# Patient Record
Sex: MALE | Race: WHITE | NOT HISPANIC OR LATINO | Employment: FULL TIME | URBAN - METROPOLITAN AREA
[De-identification: names, ages, dates, MRNs, and addresses within clinical notes are randomized per-mention and may not be internally consistent; named-entity substitution may affect disease eponyms.]

---

## 2023-09-08 PROBLEM — H81.10 BENIGN PAROXYSMAL POSITIONAL VERTIGO: Status: ACTIVE | Noted: 2023-09-08

## 2023-09-08 PROBLEM — E55.9 VITAMIN D DEFICIENCY: Status: ACTIVE | Noted: 2023-09-08

## 2023-09-08 PROBLEM — R14.0 BLOATING SYMPTOM: Status: ACTIVE | Noted: 2023-09-08

## 2023-09-08 PROBLEM — D72.819 LEUKOPENIA: Status: ACTIVE | Noted: 2023-09-08

## 2023-09-08 PROBLEM — R42 DIZZINESS: Status: ACTIVE | Noted: 2023-09-08

## 2023-11-30 ENCOUNTER — OFFICE VISIT (OUTPATIENT)
Dept: SURGERY | Facility: CLINIC | Age: 33
End: 2023-11-30
Payer: COMMERCIAL

## 2023-11-30 VITALS
WEIGHT: 183 LBS | BODY MASS INDEX: 24.79 KG/M2 | RESPIRATION RATE: 17 BRPM | HEIGHT: 72 IN | TEMPERATURE: 97.3 F | HEART RATE: 67 BPM | DIASTOLIC BLOOD PRESSURE: 90 MMHG | SYSTOLIC BLOOD PRESSURE: 147 MMHG

## 2023-11-30 DIAGNOSIS — K64.1 PROLAPSED INTERNAL HEMORRHOIDS, GRADE 2: Primary | ICD-10-CM

## 2023-11-30 PROCEDURE — 99213 OFFICE O/P EST LOW 20 MIN: CPT | Performed by: COLON & RECTAL SURGERY

## 2023-11-30 PROCEDURE — 1036F TOBACCO NON-USER: CPT | Performed by: COLON & RECTAL SURGERY

## 2023-11-30 ASSESSMENT — ENCOUNTER SYMPTOMS
PALPITATIONS: 0
WEAKNESS: 0
WHEEZING: 0
BRUISES/BLEEDS EASILY: 0
ALLERGIC/IMMUNOLOGIC NEGATIVE: 1
COUGH: 0
DIAPHORESIS: 0
CHILLS: 0
COLOR CHANGE: 0
JOINT SWELLING: 0
DIFFICULTY URINATING: 0
STRIDOR: 0
LOSS OF SENSATION IN FEET: 0
ABDOMINAL PAIN: 0
SEIZURES: 0
HEMATURIA: 0
ARTHRALGIAS: 0
DIZZINESS: 0
ABDOMINAL DISTENTION: 0
OCCASIONAL FEELINGS OF UNSTEADINESS: 0
DEPRESSION: 0
RECTAL PAIN: 0
FLANK PAIN: 0
SORE THROAT: 0
SHORTNESS OF BREATH: 0
SINUS PRESSURE: 0
BACK PAIN: 0
ANAL BLEEDING: 0
SPEECH DIFFICULTY: 0
TROUBLE SWALLOWING: 0
NERVOUS/ANXIOUS: 0

## 2023-11-30 ASSESSMENT — PATIENT HEALTH QUESTIONNAIRE - PHQ9
SUM OF ALL RESPONSES TO PHQ9 QUESTIONS 1 & 2: 0
1. LITTLE INTEREST OR PLEASURE IN DOING THINGS: NOT AT ALL
2. FEELING DOWN, DEPRESSED OR HOPELESS: NOT AT ALL

## 2023-11-30 ASSESSMENT — LIFESTYLE VARIABLES
AUDIT-C TOTAL SCORE: 1
HOW OFTEN DO YOU HAVE SIX OR MORE DRINKS ON ONE OCCASION: NEVER
HOW MANY STANDARD DRINKS CONTAINING ALCOHOL DO YOU HAVE ON A TYPICAL DAY: 1 OR 2
SKIP TO QUESTIONS 9-10: 1
HOW OFTEN DO YOU HAVE A DRINK CONTAINING ALCOHOL: MONTHLY OR LESS

## 2023-11-30 ASSESSMENT — PAIN SCALES - GENERAL: PAINLEVEL: 0-NO PAIN

## 2023-11-30 NOTE — ASSESSMENT & PLAN NOTE
After lengthy discussion with him regarding treatment for this, I explained rubber band ligation of his internal hemorrhoids with the procedure, risk, complications and no anesthetic used.  I told him my goal was to avoid a surgical hemorrhoidectomy and he then agreed to proceed with rubber band ligation today.  He was given a postprocedure instruction sheet afterwards and he will follow-up with my partner in 4 to 6 weeks.  I answered all of his questions.

## 2023-11-30 NOTE — PROGRESS NOTES
Chief Complaint   Patient presents with    Follow-up     Wanting to have a rubberband ligation done - last seen 08/29/23 for perianal dermatitis and pruritus ani. No banding done at that time.        HPI: I last saw this 33-year-old gentleman in August 2023 at which time there was a question as to whether or not he was prolapsing his internal hemorrhoids giving his symptoms of intermittent perianal itching.  At that time he was going to think about scheduling a flexible sigmoidoscopy with banding of his internal hemorrhoid and he is now back for follow-up.  The procedure was never done.  Today he still has problems with hygiene, itching and soiling especially after he moves his bowels.  He would like his hemorrhoids banded today here in the office.    Past Medical History:   Diagnosis Date    Leukopenia     Vertigo        History reviewed. No pertinent surgical history.    No current outpatient medications on file.     No current facility-administered medications for this visit.       Patient has no known allergies.    Social History     Tobacco Use    Smoking status: Never    Smokeless tobacco: Never   Substance Use Topics    Alcohol use: Yes    Drug use: Never       Family History   Problem Relation Name Age of Onset    Other (colon issues) Father         Review of Systems   Constitutional:  Negative for chills and diaphoresis.   HENT:  Negative for hearing loss, sinus pressure, sneezing, sore throat and trouble swallowing.    Eyes:  Negative for visual disturbance.   Respiratory:  Negative for cough, shortness of breath, wheezing and stridor.    Cardiovascular:  Negative for chest pain and palpitations.   Gastrointestinal:  Negative for abdominal distention, abdominal pain, anal bleeding and rectal pain.   Endocrine: Negative for cold intolerance and polyuria.   Genitourinary:  Negative for difficulty urinating, flank pain, hematuria and urgency.   Musculoskeletal:  Negative for arthralgias, back pain and joint  swelling.   Skin:  Negative for color change and rash.   Allergic/Immunologic: Negative.    Neurological:  Negative for dizziness, seizures, speech difficulty and weakness.   Hematological:  Does not bruise/bleed easily.   Psychiatric/Behavioral:  Negative for behavioral problems. The patient is not nervous/anxious.      Leukopenia, vertigo,    Rectal Exam    Perianal region shows no evidence for any external hemorrhoidal disease of note.  There is no fissure fistula or abscess.  There is no perianal dermatitis.  Digital exam shows good tone no mass and palpable redundant internal hemorrhoidal tissue particularly left and lateral.  Anoscopy confirms the largest of his internal hemorrhoidal groups to be left and lateral followed by right posterior.  Right anterior was smallest.  There is no anal canal ulcer and no blood.Hemorrhoid Bandligation Procedure Note    * Cannot find OR case *  Indications: The patient had a history of bleeding from internal hemorrhoids as described in the office note.    * No surgery found *    * No surgery found *    Surgeon: Corey Velazquez MD       Anesthesia: [unfilled]    ASA Class: [unfilled]    Procedure Details   After discussion and acceptance of all risks, benefits and alternatives an anoscope was inserted into the anal canal. The hemorrhoid petechials were defined and a quinn band was applied to the left, right posterior hemorrhoids approximately 3cm above the dente line.    Findings:  There were moderate enlarged hemorrhoids in the left, right posterior quadrants    Estimated Blood Loss:  Minimal           Drains: None           Total IV Fluids: None ml           Specimens: * Cannot find log *           Implants: * No surgical log found *           Complications:  None; patient tolerated the procedure well.           Disposition: To home when the patient had fully recovered.           Condition: stable    Attending Attestation: I was present for the entire  procedure.    *No primary surgeon found*             Problem List Items Addressed This Visit             ICD-10-CM    Prolapsed internal hemorrhoids, grade 2 - Primary K64.1     After lengthy discussion with him regarding treatment for this, I explained rubber band ligation of his internal hemorrhoids with the procedure, risk, complications and no anesthetic used.  I told him my goal was to avoid a surgical hemorrhoidectomy and he then agreed to proceed with rubber band ligation today.  He was given a postprocedure instruction sheet afterwards and he will follow-up with my partner in 4 to 6 weeks.  I answered all of his questions.

## 2024-02-09 PROBLEM — I10 ESSENTIAL (PRIMARY) HYPERTENSION: Status: RESOLVED | Noted: 2023-02-14 | Resolved: 2024-02-09

## 2024-02-09 PROBLEM — L30.9 DERMATITIS OF PERIANAL REGION: Status: RESOLVED | Noted: 2024-02-09 | Resolved: 2024-02-09

## 2024-02-13 ENCOUNTER — OFFICE VISIT (OUTPATIENT)
Dept: PRIMARY CARE | Facility: CLINIC | Age: 34
End: 2024-02-13
Payer: COMMERCIAL

## 2024-02-13 VITALS
HEIGHT: 72 IN | SYSTOLIC BLOOD PRESSURE: 120 MMHG | BODY MASS INDEX: 25.06 KG/M2 | HEART RATE: 60 BPM | OXYGEN SATURATION: 99 % | WEIGHT: 185 LBS | TEMPERATURE: 97.5 F | DIASTOLIC BLOOD PRESSURE: 86 MMHG

## 2024-02-13 DIAGNOSIS — Z13.220 SCREENING FOR CHOLESTEROL LEVEL: ICD-10-CM

## 2024-02-13 DIAGNOSIS — E55.9 VITAMIN D DEFICIENCY: ICD-10-CM

## 2024-02-13 DIAGNOSIS — Z00.00 ANNUAL PHYSICAL EXAM: Primary | ICD-10-CM

## 2024-02-13 DIAGNOSIS — K64.9 HEMORRHOIDS, UNSPECIFIED HEMORRHOID TYPE: ICD-10-CM

## 2024-02-13 PROBLEM — H81.10 BENIGN PAROXYSMAL POSITIONAL VERTIGO: Status: RESOLVED | Noted: 2023-09-08 | Resolved: 2024-02-13

## 2024-02-13 PROBLEM — R42 DIZZINESS: Status: RESOLVED | Noted: 2023-09-08 | Resolved: 2024-02-13

## 2024-02-13 PROCEDURE — 99395 PREV VISIT EST AGE 18-39: CPT | Performed by: INTERNAL MEDICINE

## 2024-02-13 PROCEDURE — 1036F TOBACCO NON-USER: CPT | Performed by: INTERNAL MEDICINE

## 2024-02-13 ASSESSMENT — ENCOUNTER SYMPTOMS
OCCASIONAL FEELINGS OF UNSTEADINESS: 0
LOSS OF SENSATION IN FEET: 0
DEPRESSION: 0

## 2024-02-13 ASSESSMENT — LIFESTYLE VARIABLES
HOW OFTEN DURING THE LAST YEAR HAVE YOU HAD A FEELING OF GUILT OR REMORSE AFTER DRINKING: NEVER
HOW OFTEN DURING THE LAST YEAR HAVE YOU FOUND THAT YOU WERE NOT ABLE TO STOP DRINKING ONCE YOU HAD STARTED: NEVER
AUDIT TOTAL SCORE: 0
HAS A RELATIVE, FRIEND, DOCTOR, OR ANOTHER HEALTH PROFESSIONAL EXPRESSED CONCERN ABOUT YOUR DRINKING OR SUGGESTED YOU CUT DOWN: NO
HOW OFTEN DURING THE LAST YEAR HAVE YOU NEEDED AN ALCOHOLIC DRINK FIRST THING IN THE MORNING TO GET YOURSELF GOING AFTER A NIGHT OF HEAVY DRINKING: NEVER
AUDIT-C TOTAL SCORE: 0
HOW OFTEN DURING THE LAST YEAR HAVE YOU BEEN UNABLE TO REMEMBER WHAT HAPPENED THE NIGHT BEFORE BECAUSE YOU HAD BEEN DRINKING: NEVER
HOW OFTEN DO YOU HAVE A DRINK CONTAINING ALCOHOL: NEVER
SKIP TO QUESTIONS 9-10: 1
HOW OFTEN DURING THE LAST YEAR HAVE YOU FAILED TO DO WHAT WAS NORMALLY EXPECTED FROM YOU BECAUSE OF DRINKING: NEVER
HOW MANY STANDARD DRINKS CONTAINING ALCOHOL DO YOU HAVE ON A TYPICAL DAY: PATIENT DOES NOT DRINK
HAVE YOU OR SOMEONE ELSE BEEN INJURED AS A RESULT OF YOUR DRINKING: NO
HOW OFTEN DO YOU HAVE SIX OR MORE DRINKS ON ONE OCCASION: NEVER

## 2024-02-13 ASSESSMENT — PATIENT HEALTH QUESTIONNAIRE - PHQ9
2. FEELING DOWN, DEPRESSED OR HOPELESS: NOT AT ALL
SUM OF ALL RESPONSES TO PHQ9 QUESTIONS 1 AND 2: 0
1. LITTLE INTEREST OR PLEASURE IN DOING THINGS: NOT AT ALL

## 2024-02-13 ASSESSMENT — PAIN SCALES - GENERAL: PAINLEVEL: 0-NO PAIN

## 2024-02-13 NOTE — PROGRESS NOTES
[facility]: [facility]  PROGRESS NOTE      Julio Lawton is a 34 y.o. male that is being seen  today for Annual Exam.  Subjective   Patient is a 34-year-old male who is being seen for annual physical examination.  Patient has history of vitamin D deficiency.  Patient is due for his blood work to be done.  Denies any hospitalization or any new medications.  Vital signs are stable.  No significant weight gain.  Patient does have history of hemorrhoids and had banding done in the past.  Patient would like to have a follow-up appointment with the general surgeon.      ROS  Negative for fever or chills  Negative for sore throat, ear pain, nasal discharge  Negative for cough, shortness of breath or wheezing  Negative for chest pain, palpitations, swelling of legs  Negative for abdominal pain, constipation, diarrhea, blood in the stools  Negative for urinary complaints  Negative for headache, dizziness, weakness or numbness  Negative for joint pain  Negative for depression or anxiety  All other systems reviewed and were negative   Vitals:    02/13/24 1120   BP: 120/86   Pulse: 60   Temp: 36.4 °C (97.5 °F)   SpO2: 99%      Vitals:    02/13/24 1120   Weight: 83.9 kg (185 lb)     Body mass index is 25.09 kg/m².  Physical Exam  Constitutional: Patient does not appear to be in any acute distress  Head and Face: NCAT  Eyes: Normal external exam, EOMI  ENT: Normal external inspection of ears and nose. Oropharynx normal.  Cardiovascular: RRR, S1/S2, no murmurs, rubs, or gallops, radial pulses +2, no edema of extremities  Pulmonary: CTAB, no respiratory distress.  Abdomen: +BS, soft, non-tender, nondistended, no guarding or rebound, no masses noted  MSK: No joint swelling, normal movements of all extremities. Range of motion- normal.  Skin- No lesions, contusions, or erythema.  Peripheral puslses palpable bilaterally 2+  Neuro: AAO X3, Cranial nerves 2-12 grossly intact,DTR 2+ in all 4 limbs  "  Psychiatric: Judgment intact. Appropriate mood and behavior    LABS   [unfilled]  Lab Results   Component Value Date    GLUCOSE 101 (H) 02/14/2023    CALCIUM 10.3 02/14/2023     02/14/2023    K 4.3 02/14/2023    CO2 29 02/14/2023     02/14/2023    BUN 16 02/14/2023    CREATININE 1.3 02/14/2023     Lab Results   Component Value Date    ALT 16 02/06/2023    AST 18 02/06/2023    ALKPHOS 67 02/06/2023    BILITOT 0.5 02/06/2023     Lab Results   Component Value Date    WBC 5.0 02/14/2023    HGB 16.5 02/14/2023    HCT 46.0 02/14/2023    MCV 87.1 02/14/2023     02/14/2023     Lab Results   Component Value Date    CHOL 153 02/06/2023    CHOL 130 (L) 11/15/2019     Lab Results   Component Value Date    HDL 49 02/06/2023    HDL 45 11/15/2019     Lab Results   Component Value Date    LDLCALC 94 02/06/2023    LDLCALC 74 11/15/2019     Lab Results   Component Value Date    TRIG 52 02/06/2023    TRIG 53 11/15/2019     No results found for: \"HGBA1C\"  Other labs not included in the list above were reviewed either before or during this encounter.    History    Past Medical History:   Diagnosis Date    Benign paroxysmal positional vertigo 09/08/2023    Bloating symptom     Chronic leukopenia     Dermatitis of perianal region 02/09/2024    Dizziness 09/08/2023    Essential (primary) hypertension 02/14/2023    Hemorrhoids     Leukopenia     Perianal dermatitis     Pruritus ani     Vertigo     Vitamin D deficiency      History reviewed. No pertinent surgical history.  Family History   Problem Relation Name Age of Onset    Other (colon issues) Father       No Known Allergies  No current outpatient medications on file prior to visit.     No current facility-administered medications on file prior to visit.     Immunization History   Administered Date(s) Administered    DTP 1992, 1992, 1992    Flu vaccine (IIV4), preservative free *Check age/dose* 09/12/2020, 10/18/2023    Flu vaccine, quadrivalent, no " egg protein, age 6 month or greater (FLUCELVAX) 09/22/2019, 12/03/2021    HiB, unspecified 1992, 1992, 1992, 03/10/1993    Influenza, injectable, quadrivalent 09/01/2020    Influenza, seasonal, injectable 11/04/2010    MMR vaccine, subcutaneous (MMR II) 03/10/1993, 03/11/2004    Meningococcal MCV4P 06/14/2007, 11/04/2010    OPV 1992, 1992    Pfizer Purple Cap SARS-CoV-2 03/24/2021, 04/21/2021, 01/27/2022    Td vaccine, age 7 years and older (TENIVAC) 07/22/2008    Tdap vaccine, age 7 year and older (BOOSTRIX, ADACEL) 02/24/2023     Patient's medical history was reviewed and updated either before or during this encounter.  ASSESSMENT / PLAN:  Diagnoses and all orders for this visit:  Annual physical exam  -     CBC; Future  -     Comprehensive Metabolic Panel; Future  Screening for cholesterol level  -     Lipid Panel; Future  Hemorrhoids, unspecified hemorrhoid type  -     Referral to General Surgery; Future  Vitamin D deficiency  -     Vitamin D 25-Hydroxy,Total (for eval of Vitamin D levels); Future          Neri Castro MD

## 2024-02-17 ENCOUNTER — LAB (OUTPATIENT)
Dept: LAB | Facility: LAB | Age: 34
End: 2024-02-17
Payer: COMMERCIAL

## 2024-02-17 DIAGNOSIS — Z00.00 ANNUAL PHYSICAL EXAM: ICD-10-CM

## 2024-02-17 DIAGNOSIS — E55.9 VITAMIN D DEFICIENCY: ICD-10-CM

## 2024-02-17 DIAGNOSIS — Z13.220 SCREENING FOR CHOLESTEROL LEVEL: ICD-10-CM

## 2024-02-17 LAB
25(OH)D3 SERPL-MCNC: 23 NG/ML (ref 31–100)
ALBUMIN SERPL-MCNC: 5 G/DL (ref 3.5–5)
ALP BLD-CCNC: 70 U/L (ref 35–125)
ALT SERPL-CCNC: 20 U/L (ref 5–40)
ANION GAP SERPL CALC-SCNC: 9 MMOL/L
AST SERPL-CCNC: 20 U/L (ref 5–40)
BILIRUB SERPL-MCNC: 0.7 MG/DL (ref 0.1–1.2)
BUN SERPL-MCNC: 16 MG/DL (ref 8–25)
CALCIUM SERPL-MCNC: 10.1 MG/DL (ref 8.5–10.4)
CHLORIDE SERPL-SCNC: 101 MMOL/L (ref 97–107)
CHOLEST SERPL-MCNC: 175 MG/DL (ref 133–200)
CHOLEST/HDLC SERPL: 3.4 {RATIO}
CO2 SERPL-SCNC: 27 MMOL/L (ref 24–31)
CREAT SERPL-MCNC: 1.2 MG/DL (ref 0.4–1.6)
EGFRCR SERPLBLD CKD-EPI 2021: 82 ML/MIN/1.73M*2
ERYTHROCYTE [DISTWIDTH] IN BLOOD BY AUTOMATED COUNT: 11.9 % (ref 11.5–14.5)
GLUCOSE SERPL-MCNC: 94 MG/DL (ref 65–99)
HCT VFR BLD AUTO: 45.8 % (ref 41–52)
HDLC SERPL-MCNC: 52 MG/DL
HGB BLD-MCNC: 15.6 G/DL (ref 13.5–17.5)
LDLC SERPL CALC-MCNC: 111 MG/DL (ref 65–130)
MCH RBC QN AUTO: 30.2 PG (ref 26–34)
MCHC RBC AUTO-ENTMCNC: 34.1 G/DL (ref 32–36)
MCV RBC AUTO: 89 FL (ref 80–100)
NRBC BLD-RTO: 0 /100 WBCS (ref 0–0)
PLATELET # BLD AUTO: 197 X10*3/UL (ref 150–450)
POTASSIUM SERPL-SCNC: 4.9 MMOL/L (ref 3.4–5.1)
PROT SERPL-MCNC: 7.6 G/DL (ref 5.9–7.9)
RBC # BLD AUTO: 5.17 X10*6/UL (ref 4.5–5.9)
SODIUM SERPL-SCNC: 137 MMOL/L (ref 133–145)
TRIGL SERPL-MCNC: 58 MG/DL (ref 40–150)
WBC # BLD AUTO: 3.7 X10*3/UL (ref 4.4–11.3)

## 2024-02-17 PROCEDURE — 85027 COMPLETE CBC AUTOMATED: CPT

## 2024-02-17 PROCEDURE — 80053 COMPREHEN METABOLIC PANEL: CPT

## 2024-02-17 PROCEDURE — 36415 COLL VENOUS BLD VENIPUNCTURE: CPT

## 2024-02-17 PROCEDURE — 80061 LIPID PANEL: CPT

## 2024-02-17 PROCEDURE — 82306 VITAMIN D 25 HYDROXY: CPT

## 2024-02-20 ENCOUNTER — TELEPHONE (OUTPATIENT)
Dept: PRIMARY CARE | Facility: CLINIC | Age: 34
End: 2024-02-20
Payer: COMMERCIAL

## 2024-02-20 RX ORDER — ACETAMINOPHEN 500 MG
5000 TABLET ORAL DAILY
COMMUNITY

## 2024-02-20 NOTE — TELEPHONE ENCOUNTER
----- Message from Neri Castro MD sent at 2/20/2024  1:13 PM EST -----  Labs are stable except low vitamin D and WBC count.  Need to take Vitamin D3 5000 international units DAILY

## 2024-11-14 ENCOUNTER — APPOINTMENT (OUTPATIENT)
Dept: SURGERY | Facility: CLINIC | Age: 34
End: 2024-11-14
Payer: COMMERCIAL

## 2024-11-15 ENCOUNTER — OFFICE VISIT (OUTPATIENT)
Dept: SURGERY | Facility: CLINIC | Age: 34
End: 2024-11-15
Payer: COMMERCIAL

## 2024-11-15 VITALS
HEART RATE: 74 BPM | SYSTOLIC BLOOD PRESSURE: 139 MMHG | BODY MASS INDEX: 23.99 KG/M2 | DIASTOLIC BLOOD PRESSURE: 84 MMHG | HEIGHT: 73 IN | WEIGHT: 181 LBS

## 2024-11-15 DIAGNOSIS — K63.0: Primary | ICD-10-CM

## 2024-11-15 PROCEDURE — 99203 OFFICE O/P NEW LOW 30 MIN: CPT | Performed by: NURSE PRACTITIONER

## 2024-11-15 PROCEDURE — 3008F BODY MASS INDEX DOCD: CPT | Performed by: NURSE PRACTITIONER

## 2024-11-15 PROCEDURE — 99213 OFFICE O/P EST LOW 20 MIN: CPT | Performed by: NURSE PRACTITIONER

## 2024-11-15 PROCEDURE — 46600 DIAGNOSTIC ANOSCOPY SPX: CPT | Performed by: NURSE PRACTITIONER

## 2024-11-15 NOTE — PROGRESS NOTES
History Of Present Illness  Julio Lawton is a 34 y.o. male presenting with hemorrhoidal issues.    He started having hemorrhoidal issues 2-3 years ago.  He saw Dr. Velazquez in the past and had a rubber band ligation last year.      He has irritation to the hemorrhoids but no bleeding.  He feels the mucus drainage and will have that throughout the day.  He will have itching and burning to the skin and will use corn starch at times.      He will have bloating that comes and goes.  No c/o any n/v.  NO c/o any abdominal pain.    He has 1-2 soft Bm every day with no straining.  He does not sit long on the toilet to have a BM.      Father with UC.  No family hx of colorectal cancers.     Past Medical History  He has a past medical history of Benign paroxysmal positional vertigo (09/08/2023), Bloating symptom, Chronic leukopenia, Dermatitis of perianal region (02/09/2024), Dizziness (09/08/2023), Essential (primary) hypertension (02/14/2023), Hemorrhoids, Leukopenia, Perianal dermatitis, Pruritus ani, Vertigo, and Vitamin D deficiency.    Surgical History  He has no past surgical history on file.     Social History  He reports that he has never smoked. He has never used smokeless tobacco. He reports that he does not currently use alcohol. He reports that he does not use drugs.    Family History  Family History   Problem Relation Name Age of Onset    Other (colon issues) Father          Allergies  Patient has no known allergies.    Review of Systems   All other systems reviewed and are negative.       Physical Exam  Constitutional:       Appearance: Normal appearance.   HENT:      Head: Normocephalic and atraumatic.   Pulmonary:      Effort: Pulmonary effort is normal.   Musculoskeletal:         General: Normal range of motion.   Skin:     General: Skin is warm and dry.   Neurological:      General: No focal deficit present.      Mental Status: He is alert and oriented to person, place, and time.   Psychiatric:          Mood and Affect: Mood normal.         Behavior: Behavior normal.         Anoscopy    Date/Time: 11/15/2024 8:24 AM    Performed by: ERWIN Frias  Authorized by: ERWIN Frias    Consent:     Consent obtained:  Verbal    Consent given by:  Patient    Risks, benefits, and alternatives were discussed: yes    Universal protocol:     Procedure explained and questions answered to patient or proxy's satisfaction: yes      Patient identity confirmed:  Verbally with patient  Post-procedure details:     Procedure completion:  Tolerated  Comments:      No external hemorrhoids.  Pruritus ani.  Good tone on KATINA, no pain.  ON anoscopy, looking in 360 degrees, he has a lot of pus and mucus in the rectal vault that was suctioned out.  No inflammation of the internal hemorrhoids.  No active bleeding    Last Recorded Vitals  /84   Pulse 74   Wt 82.1 kg (181 lb)        Assessment/Plan   Julio has a lot of pus drainage from the colon but I could not tell where it was coming from.  He will schedule to have a colonoscopy in the near future.  He will use Desitin ointment prn for the pruritus ani.  He will start taking Metamucil to help bulk up his stools and keep them soft.  He will call with any issues and will follow up after the colonoscopy if needed.         ERWIN Frias

## 2024-11-18 DIAGNOSIS — Z12.11 COLON CANCER SCREENING: ICD-10-CM

## 2024-11-18 RX ORDER — POLYETHYLENE GLYCOL 3350, SODIUM SULFATE ANHYDROUS, SODIUM BICARBONATE, SODIUM CHLORIDE, POTASSIUM CHLORIDE 236; 22.74; 6.74; 5.86; 2.97 G/4L; G/4L; G/4L; G/4L; G/4L
POWDER, FOR SOLUTION ORAL
Qty: 4000 ML | Refills: 0 | Status: SHIPPED | OUTPATIENT
Start: 2024-11-18 | End: 2024-11-21 | Stop reason: ALTCHOICE

## 2024-11-19 PROBLEM — K63.0: Status: ACTIVE | Noted: 2024-11-19

## 2024-11-21 ENCOUNTER — OFFICE VISIT (OUTPATIENT)
Dept: GASTROENTEROLOGY | Facility: EXTERNAL LOCATION | Age: 34
End: 2024-11-21
Payer: COMMERCIAL

## 2024-11-21 DIAGNOSIS — K63.0: Primary | ICD-10-CM

## 2024-11-21 DIAGNOSIS — K63.0: ICD-10-CM

## 2024-11-21 DIAGNOSIS — K64.4 EXTERNAL HEMORRHOIDS: ICD-10-CM

## 2024-11-21 PROCEDURE — 45378 DIAGNOSTIC COLONOSCOPY: CPT | Performed by: INTERNAL MEDICINE

## 2024-11-21 NOTE — PROGRESS NOTES
Colonoscopy performed today 11/21/2024 at the [facility] ([facility]).  See procedure report(s) under Media tab.

## 2024-12-20 ENCOUNTER — APPOINTMENT (OUTPATIENT)
Dept: GASTROENTEROLOGY | Facility: EXTERNAL LOCATION | Age: 34
End: 2024-12-20
Payer: COMMERCIAL

## 2025-01-13 NOTE — PROGRESS NOTES
Julio Lawton is a 35 year old male presents to the office for evaluation of hemorrhoids.    He started having hemorrhoidal issues 2-3 years ago.  He saw Dr. Velazquez in the past and had a rubber band ligation last year.       He has irritation to the hemorrhoids but no bleeding.  He feels the mucus drainage and will have that throughout the day.  He will have itching and burning to the skin and will use corn starch at times.       He will have bloating that comes and goes.  No c/o any n/v.  NO c/o any abdominal pain.     He has 1-2 soft Bm every day with no straining.  He does not sit long on the toilet to have a BM.      11/21/2024 Colonoscopy to [facility]  The perianal and digital rectal exams were normal.  Non-bleeding external hemorrhoids were found during retroflexion. The hemorrhoids were small.  A scar was found in the rectum, related to a prior hemorrhoid treatment.  The scar tissue was healthy in appearance.  The terminal ileum appeared normal.  Retroflexion in the ascending colon was normal.  The exam was otherwise without abnormality.        Past Medical History  Vertigo  HTN      Surgical History      Social History  Smoking:   ETOH:    Family History  Father:  UC    Review of Systems  Constitutional: Negative for fever, chills, anorexia, weight loss, malaise     ENMT: Negative for nasal discharge, congestion, ear pain, mouth pain, throat pain     Respiratory: Negative for cough, hemoptysis, wheezing, shortness of breath     Cardiac: Negative for chest pain, dyspnea on exertion, orthopnea, palpitations, syncope     Gastrointestinal: Negative for nausea, vomiting, diarrhea, constipation, abdominal pain,     Genitourinary: Negative for discharge, dysuria, flank pain, frequency, hematuria     Musculoskeletal: Negative for decreased ROM, pain, swelling, weakness     Neurological: Negative for dizziness, confusion, headache, seizures, syncope     Psychiatric: Negative for mood changes, anxiety,  hallucinations, sleep changes, suicidal ideas     Skin: Negative for mass, pain, itching, rash, ulcer     Endocrine: Negative for heat intolerance, cold intolerance, excessive sweating, polyuria, excess thirst     Hematologic/Lymph: Negative for anemia, bruising, easy bleeding, night sweats, petechiae, history of DVT/PE or cancer     Allergic/Immunologic: Negative for anaphylaxis, itchy/ teary eyes, itching, sneezing, swelling    Physical Exam  Constitutional: Well developed, awake/alert/oriented x3, no distress, alert and cooperative             Eyes: Sclera anicteric, no conjunctival inflammation, conjugate gaze    ENMT: mucous membranes moist, no apparent injury,            Head/Neck: Neck supple, no apparent injury, No JVD, trachea midline, no bruits              Respiratory/Thorax: Patent airways, CTAB, normal breath sounds with good chest expansion, thorax symmetric         Cardiovascular: Regular, rate and rhythm, no murmurs, normal S1 and S2         Gastrointestinal: Nondistended, soft, non-tender, no rebound tenderness or guarding, no masses palpable, no organomegaly, +BS, no bruits               Extremities: normal extremities, no cyanosis edema, contusions or wounds, 2+ femoral pulses B/L              Neurological: alert and oriented x3, normal strength, Normal gait          Lymphatic: No palpable inguinal lymphadenopathy   Psychological: Appropriate mood and behavior         Skin: Warm and dry, no lesions, no rashes                Anorectal:      Impression:      Plan:

## 2025-02-04 ENCOUNTER — TELEPHONE (OUTPATIENT)
Dept: PRIMARY CARE | Facility: CLINIC | Age: 35
End: 2025-02-04
Payer: COMMERCIAL

## 2025-02-04 ENCOUNTER — APPOINTMENT (OUTPATIENT)
Dept: SURGERY | Facility: CLINIC | Age: 35
End: 2025-02-04
Payer: COMMERCIAL

## 2025-02-04 DIAGNOSIS — R11.0 NAUSEA: Primary | ICD-10-CM

## 2025-02-04 RX ORDER — ONDANSETRON 4 MG/1
4 TABLET, ORALLY DISINTEGRATING ORAL EVERY 8 HOURS PRN
Qty: 20 TABLET | Refills: 0 | Status: SHIPPED | OUTPATIENT
Start: 2025-02-04 | End: 2025-02-11

## 2025-02-04 NOTE — TELEPHONE ENCOUNTER
Probably a viral infection such as norovirus.  Agree with testing for COVID.  I sent in Zofran which can be used for nausea and vomiting.  If unable to stay hydrated by drinking water (sips) or persistent high fever (over 102) or blood in stool he should be seen in the ER for IV fluids and further evaluation.

## 2025-02-04 NOTE — TELEPHONE ENCOUNTER
Gloria pt.  Per spouse, pt c/o vomiting, diarrhea, on and off stomach cramps, chills, congestion, cough x12 hours.  Not eating, drinking gatorade, pedialyte, water.   Advised to test for COVID     Requesting further instructions.  Please advise.  Ph: [phone]    [facility] [address] [place] [address], [place]

## 2025-02-18 ENCOUNTER — OFFICE VISIT (OUTPATIENT)
Dept: PRIMARY CARE | Facility: CLINIC | Age: 35
End: 2025-02-18
Payer: COMMERCIAL

## 2025-02-18 VITALS
OXYGEN SATURATION: 99 % | DIASTOLIC BLOOD PRESSURE: 74 MMHG | SYSTOLIC BLOOD PRESSURE: 118 MMHG | HEART RATE: 64 BPM | HEIGHT: 73 IN | TEMPERATURE: 96.8 F | BODY MASS INDEX: 23.46 KG/M2 | WEIGHT: 177 LBS

## 2025-02-18 DIAGNOSIS — Z13.1 SCREENING FOR DIABETES MELLITUS: ICD-10-CM

## 2025-02-18 DIAGNOSIS — Z00.00 ANNUAL PHYSICAL EXAM: Primary | ICD-10-CM

## 2025-02-18 DIAGNOSIS — Z13.220 SCREENING FOR CHOLESTEROL LEVEL: ICD-10-CM

## 2025-02-18 DIAGNOSIS — E55.9 VITAMIN D DEFICIENCY: ICD-10-CM

## 2025-02-18 PROCEDURE — 1036F TOBACCO NON-USER: CPT | Performed by: INTERNAL MEDICINE

## 2025-02-18 PROCEDURE — 99395 PREV VISIT EST AGE 18-39: CPT | Performed by: INTERNAL MEDICINE

## 2025-02-18 PROCEDURE — 3008F BODY MASS INDEX DOCD: CPT | Performed by: INTERNAL MEDICINE

## 2025-02-18 ASSESSMENT — PATIENT HEALTH QUESTIONNAIRE - PHQ9
SUM OF ALL RESPONSES TO PHQ9 QUESTIONS 1 AND 2: 0
1. LITTLE INTEREST OR PLEASURE IN DOING THINGS: NOT AT ALL
2. FEELING DOWN, DEPRESSED OR HOPELESS: NOT AT ALL

## 2025-02-18 ASSESSMENT — PAIN SCALES - GENERAL: PAINLEVEL_OUTOF10: 0-NO PAIN

## 2025-02-18 ASSESSMENT — ENCOUNTER SYMPTOMS
DEPRESSION: 0
OCCASIONAL FEELINGS OF UNSTEADINESS: 0
LOSS OF SENSATION IN FEET: 0

## 2025-02-18 NOTE — PROGRESS NOTES
[facility]: [facility]  PROGRESS NOTE      Julio Lawton is a 35 y.o. male that is being seen  today for Annual Exam.  Subjective   Patient is a 35-year-old male who is being seen for annual issues with hemorrhoid has been seen by surgeon and had banding in the past  Patient recently had a GI symptoms with the possible norovirus which are better now.  Patient is due for blood work done.  Denies any significant complaints.      ROS  Negative for fever or chills  Negative for sore throat, ear pain, nasal discharge  Negative for cough, shortness of breath or wheezing  Negative for chest pain, palpitations, swelling of legs  Negative for abdominal pain, constipation, diarrhea, blood in the stools  Negative for urinary complaints  Negative for headache, dizziness, weakness or numbness  Negative for joint pain  Negative for depression or anxiety  All other systems reviewed and were negative   Vitals:    02/18/25 1125   BP: 118/74   Pulse: 64   Temp: 36 °C (96.8 °F)   SpO2: 99%      Vitals:    02/18/25 1125   Weight: 80.3 kg (177 lb)     Body mass index is 23.35 kg/m².  Physical Exam  Constitutional: Patient does not appear to be in any acute distress  Head and Face: NCAT  Eyes: Normal external exam, EOMI  ENT: Normal external inspection of ears and nose. Oropharynx normal.  Cardiovascular: RRR, S1/S2, no murmurs, rubs, or gallops, radial pulses +2, no edema of extremities  Pulmonary: CTAB, no respiratory distress.  Abdomen: +BS, soft, non-tender, nondistended, no guarding or rebound, no masses noted  MSK: No joint swelling, normal movements of all extremities. Range of motion- normal.  Skin-patient has ganglionic over his right foot.    Peripheral puslses palpable bilaterally 2+  Neuro: AAO X3, Cranial nerves 2-12 grossly intact,DTR 2+ in all 4 limbs   Psychiatric: Judgment intact. Appropriate mood and behavior    LABS   [unfilled]  Lab Results   Component Value Date    GLUCOSE 94 02/17/2024     "CALCIUM 10.1 2024     2024    K 4.9 2024    CO2 27 2024     2024    BUN 16 2024    CREATININE 1.20 2024     Lab Results   Component Value Date    ALT 20 2024    AST 20 2024    ALKPHOS 70 2024    BILITOT 0.7 2024     Lab Results   Component Value Date    WBC 3.7 (L) 2024    HGB 15.6 2024    HCT 45.8 2024    MCV 89 2024     2024     Lab Results   Component Value Date    CHOL 175 2024    CHOL 153 2023    CHOL 130 (L) 11/15/2019     Lab Results   Component Value Date    HDL 52.0 2024    HDL 49 2023    HDL 45 11/15/2019     Lab Results   Component Value Date    LDLCALC 111 2024    LDLCALC 94 2023    LDLCALC 74 11/15/2019     Lab Results   Component Value Date    TRIG 58 2024    TRIG 52 2023    TRIG 53 11/15/2019     No results found for: \"HGBA1C\"  Other labs not included in the list above were reviewed either before or during this encounter.    History    Past Medical History:   Diagnosis Date    Benign paroxysmal positional vertigo 2023    Bloating symptom     Chronic leukopenia     Dermatitis of perianal region 2024    Dizziness 2023    Essential (primary) hypertension 2023    Hemorrhoids     Leukopenia     Perianal dermatitis     Pruritus ani     Vertigo     Vitamin D deficiency      History reviewed. No pertinent surgical history.  Family History   Problem Relation Name Age of Onset    Other (colon issues) Father       No Known Allergies  Current Outpatient Medications on File Prior to Visit   Medication Sig Dispense Refill    [] ondansetron ODT (Zofran-ODT) 4 mg disintegrating tablet Dissolve 1 tablet (4 mg) in the mouth every 8 hours if needed for nausea or vomiting for up to 7 days. 20 tablet 0     No current facility-administered medications on file prior to visit.     Immunization History   Administered Date(s) " Administered    COVID-19, mRNA, LNP-S, PF, 30 mcg/0.3 mL dose 03/24/2021, 04/21/2021, 01/27/2022    DTP 1992, 1992, 1992    Flu vaccine (IIV4), preservative free *Check age/dose* 09/12/2020, 10/18/2023    Flu vaccine, quadrivalent, no egg protein, age 6 month or greater (FLUCELVAX) 09/22/2019, 12/03/2021    Flu vaccine, trivalent, preservative free, no egg protein, age 6 months or greater (Flucelvax) 12/17/2024    HiB, unspecified 1992, 1992, 1992, 03/10/1993    Influenza, injectable, quadrivalent 09/01/2020    Influenza, seasonal, injectable 11/04/2010    MMR vaccine, subcutaneous (MMR II) 03/10/1993, 03/11/2004    Meningococcal ACWY-D (Menactra) 4-valent conjugate vaccine 06/14/2007, 11/04/2010    OPV 1992, 1992    Td vaccine, age 7 years and older (TENIVAC) 07/22/2008    Tdap vaccine, age 7 year and older (BOOSTRIX, ADACEL) 02/24/2023     Patient's medical history was reviewed and updated either before or during this encounter.  ASSESSMENT / PLAN:  Diagnoses and all orders for this visit:  Annual physical exam  -     CBC; Future  -     Comprehensive Metabolic Panel; Future  Screening for cholesterol level  -     Lipid Panel; Future  Vitamin D deficiency  -     Vitamin D 25-Hydroxy,Total (for eval of Vitamin D levels); Future  Screening for diabetes mellitus  -     Hemoglobin A1C; Future    Patient is being seen for annual physical examination.  Patient is due for lab work to be done patient will follow-up with the general surgeon for hemorrhoids      Neri Castro MD

## 2025-02-27 LAB
25(OH)D3+25(OH)D2 SERPL-MCNC: 35 NG/ML (ref 30–100)
ALBUMIN SERPL-MCNC: 4.7 G/DL (ref 3.6–5.1)
ALP SERPL-CCNC: 53 U/L (ref 36–130)
ALT SERPL-CCNC: 19 U/L (ref 9–46)
ANION GAP SERPL CALCULATED.4IONS-SCNC: 6 MMOL/L (CALC) (ref 7–17)
AST SERPL-CCNC: 16 U/L (ref 10–40)
BILIRUB SERPL-MCNC: 0.8 MG/DL (ref 0.2–1.2)
BUN SERPL-MCNC: 12 MG/DL (ref 7–25)
CALCIUM SERPL-MCNC: 9.5 MG/DL (ref 8.6–10.3)
CHLORIDE SERPL-SCNC: 103 MMOL/L (ref 98–110)
CHOLEST SERPL-MCNC: 142 MG/DL
CHOLEST/HDLC SERPL: 2.8 (CALC)
CO2 SERPL-SCNC: 31 MMOL/L (ref 20–32)
CREAT SERPL-MCNC: 1.06 MG/DL (ref 0.6–1.26)
EGFRCR SERPLBLD CKD-EPI 2021: 95 ML/MIN/1.73M2
ERYTHROCYTE [DISTWIDTH] IN BLOOD BY AUTOMATED COUNT: 13.1 % (ref 11–15)
EST. AVERAGE GLUCOSE BLD GHB EST-MCNC: 94 MG/DL
EST. AVERAGE GLUCOSE BLD GHB EST-SCNC: 5.2 MMOL/L
GLUCOSE SERPL-MCNC: 93 MG/DL (ref 65–99)
HBA1C MFR BLD: 4.9 % OF TOTAL HGB
HCT VFR BLD AUTO: 45.5 % (ref 38.5–50)
HDLC SERPL-MCNC: 51 MG/DL
HGB BLD-MCNC: 15.1 G/DL (ref 13.2–17.1)
LDLC SERPL CALC-MCNC: 78 MG/DL (CALC)
MCH RBC QN AUTO: 30.1 PG (ref 27–33)
MCHC RBC AUTO-ENTMCNC: 33.2 G/DL (ref 32–36)
MCV RBC AUTO: 90.8 FL (ref 80–100)
NONHDLC SERPL-MCNC: 91 MG/DL (CALC)
PLATELET # BLD AUTO: 171 THOUSAND/UL (ref 140–400)
PMV BLD REES-ECKER: 11.3 FL (ref 7.5–12.5)
POTASSIUM SERPL-SCNC: 4.1 MMOL/L (ref 3.5–5.3)
PROT SERPL-MCNC: 7.1 G/DL (ref 6.1–8.1)
RBC # BLD AUTO: 5.01 MILLION/UL (ref 4.2–5.8)
SODIUM SERPL-SCNC: 140 MMOL/L (ref 135–146)
TRIGL SERPL-MCNC: 47 MG/DL
WBC # BLD AUTO: 3 THOUSAND/UL (ref 3.8–10.8)

## 2025-03-10 ENCOUNTER — TELEPHONE (OUTPATIENT)
Dept: PRIMARY CARE | Facility: CLINIC | Age: 35
End: 2025-03-10
Payer: COMMERCIAL

## 2025-03-10 NOTE — TELEPHONE ENCOUNTER
----- Message from Neri Castro sent at 3/10/2025 12:59 PM EDT -----  All labs are normal except low white cell count which has been chronically controlled.  Will continue to monitor.

## 2026-02-24 ENCOUNTER — APPOINTMENT (OUTPATIENT)
Dept: PRIMARY CARE | Facility: CLINIC | Age: 36
End: 2026-02-24
Payer: COMMERCIAL